# Patient Record
Sex: MALE | Race: WHITE | NOT HISPANIC OR LATINO | Employment: OTHER | URBAN - METROPOLITAN AREA
[De-identification: names, ages, dates, MRNs, and addresses within clinical notes are randomized per-mention and may not be internally consistent; named-entity substitution may affect disease eponyms.]

---

## 2018-01-02 ENCOUNTER — HOSPITAL ENCOUNTER (OUTPATIENT)
Dept: RADIOLOGY | Facility: OTHER | Age: 43
Discharge: HOME | End: 2018-01-02
Attending: PHYSICIAN ASSISTANT
Payer: COMMERCIAL

## 2018-01-02 ENCOUNTER — OFFICE VISIT (OUTPATIENT)
Dept: SPINE | Facility: CLINIC | Age: 43
End: 2018-01-02
Payer: COMMERCIAL

## 2018-01-02 ENCOUNTER — TELEPHONE (OUTPATIENT)
Dept: SPINE | Facility: CLINIC | Age: 43
End: 2018-01-02

## 2018-01-02 VITALS
WEIGHT: 183 LBS | DIASTOLIC BLOOD PRESSURE: 82 MMHG | HEIGHT: 70 IN | SYSTOLIC BLOOD PRESSURE: 130 MMHG | HEART RATE: 73 BPM | BODY MASS INDEX: 26.2 KG/M2

## 2018-01-02 DIAGNOSIS — M54.14 THORACIC AND LUMBOSACRAL NEURITIS: ICD-10-CM

## 2018-01-02 DIAGNOSIS — M47.819 SPONDYLOSIS WITHOUT MYELOPATHY: ICD-10-CM

## 2018-01-02 DIAGNOSIS — M54.5 LOW BACK PAIN, UNSPECIFIED BACK PAIN LATERALITY, UNSPECIFIED CHRONICITY, WITH SCIATICA PRESENCE UNSPECIFIED: Primary | ICD-10-CM

## 2018-01-02 DIAGNOSIS — M54.5 LOW BACK PAIN, UNSPECIFIED BACK PAIN LATERALITY, UNSPECIFIED CHRONICITY, WITH SCIATICA PRESENCE UNSPECIFIED: ICD-10-CM

## 2018-01-02 DIAGNOSIS — M54.42 ACUTE LEFT-SIDED LOW BACK PAIN WITH LEFT-SIDED SCIATICA: ICD-10-CM

## 2018-01-02 DIAGNOSIS — M51.37 DDD (DEGENERATIVE DISC DISEASE), LUMBOSACRAL: ICD-10-CM

## 2018-01-02 DIAGNOSIS — Z98.890 HISTORY OF LUMBAR LAMINECTOMY: Primary | ICD-10-CM

## 2018-01-02 DIAGNOSIS — M54.17 THORACIC AND LUMBOSACRAL NEURITIS: ICD-10-CM

## 2018-01-02 PROCEDURE — 99999 PR PBB SHADOW E&M-NEW PATIENT-LVL III: CPT | Mod: PBBFAC,,, | Performed by: PHYSICIAN ASSISTANT

## 2018-01-02 PROCEDURE — 72120 X-RAY BEND ONLY L-S SPINE: CPT | Mod: 26,FY,, | Performed by: RADIOLOGY

## 2018-01-02 PROCEDURE — 72120 X-RAY BEND ONLY L-S SPINE: CPT | Mod: TC,FY

## 2018-01-02 PROCEDURE — 72100 X-RAY EXAM L-S SPINE 2/3 VWS: CPT | Mod: 26,FY,, | Performed by: RADIOLOGY

## 2018-01-02 PROCEDURE — 99204 OFFICE O/P NEW MOD 45 MIN: CPT | Mod: S$GLB,,, | Performed by: PHYSICIAN ASSISTANT

## 2018-01-02 RX ORDER — METHOCARBAMOL 500 MG/1
500 TABLET, FILM COATED ORAL 3 TIMES DAILY PRN
Qty: 90 TABLET | Refills: 0 | Status: SHIPPED | OUTPATIENT
Start: 2018-01-02 | End: 2018-02-01

## 2018-01-02 RX ORDER — METHYLPREDNISOLONE 4 MG/1
TABLET ORAL
Qty: 1 PACKAGE | Refills: 0 | Status: SHIPPED | OUTPATIENT
Start: 2018-01-02

## 2018-01-02 NOTE — PROGRESS NOTES
Subjective:      Patient ID: Sallie Singh is a 42 y.o. male.    Chief Complaint: No chief complaint on file.      HPI     History of excision of subcutaneous lesion measuring approximately 2-3 cm in right upper back region by Dr. Doyle on 10/5/12. Had lumbar laminectomy 20 years ago and did well after surgery.     2-3 months ago he did some heavy lifting and had flare up of LBP that resolved. Did a lot of walking in last week and did fine. Woke up 2 days ago with constant left sided LBP with left leg pain posterior to his knee. No right leg pain. LBP = left leg pain. Pain kept him up last night. Some improvement in pain with walking and standing. Pain is worse with laying flat, bending. He rates his pain as a 7 on a scale of 1-10. Pain is described as sharp in nautre. No leg pain since his surgery above. No numbness, tingling, or weakness.     No PT or ESIs for his back. Surgery as above. He tried aspirin with some improvement. He is otherwise healthy.     Patient denies fevers, chills, night sweats, nausea, vomiting, and weight loss. Patient also denies bowel/bladder dysfunction, sexual dysfunction, and any saddle anesthesia.       Review of Systems   Constitution: Negative for fever, weakness, malaise/fatigue, night sweats, weight gain and weight loss.   HENT: Negative for hearing loss, nosebleeds and odynophagia.    Eyes: Negative for blurred vision and double vision.   Cardiovascular: Negative for chest pain, irregular heartbeat and palpitations.   Respiratory: Negative for cough, hemoptysis, shortness of breath and wheezing.    Endocrine: Negative for cold intolerance and polydipsia.   Hematologic/Lymphatic: Does not bruise/bleed easily.   Skin: Negative for dry skin, poor wound healing, rash and suspicious lesions.   Musculoskeletal: Positive for back pain.        See HPI for pertinent positives.   Gastrointestinal: Negative for bloating, abdominal pain, constipation, diarrhea, hematochezia, melena, nausea and  vomiting.   Genitourinary: Negative for bladder incontinence, dysuria, hematuria, hesitancy and incomplete emptying.   Neurological: Negative for disturbances in coordination, dizziness, focal weakness, headaches, loss of balance, numbness, paresthesias and seizures.   Psychiatric/Behavioral: Negative for depression and hallucinations. The patient is not nervous/anxious.            Objective:        General: Sallie is well-developed, well-nourished, appears stated age, in no acute distress, alert and oriented to time, place and person.     General    Vitals reviewed.  Constitutional: He is oriented to person, place, and time. He appears well-developed and well-nourished.   Pulmonary/Chest: Effort normal.   Abdominal: He exhibits no distension.   Neurological: He is alert and oriented to person, place, and time.   Psychiatric: He has a normal mood and affect. His behavior is normal. Judgment and thought content normal.           Gait: normal, tandem walking is normal and he is able to heel/toe stand.     On exam of the lumbar spine, well healed posterior lumbar incision. Minimal left sided lower lumbar tenderness.     Skin in lumbar region is warm to the touch without visible rashes.     muscle tone normal without spasm, limited range of motion with pain  Pain in flexion. and Pain in extension.    Strength testing of the bilateral LEs shows  Right hip abduction:  +5/5  Left hip abduction:  +5/5  Right hip flexion:  +5/5   Left hip flexion:  +5/5  Right hip extensors:  +5/5  Left hip extensors:  +5/5  Right quadriceps:  +5/5  Left quadriceps:  +5/5  Right hamstring:  +5/5  Left hamstring:  +5/5  Right dorsiflexion:  +5/5  Left dorsiflexion:  +5/5  Right plantar flexion:  +5/5  Left plantar flexion:  +5/5   Right EHL:  +5/5   Left EHL:  +5/5    negative clonus of bilateral LEs.     negative straight leg raise on bilateral LEs, however SLR on left LE recreates his back pain and he has tight hamstrings left > right LE.      DTRs:  Right patellar:  +2     Left patellar:  +2  Right achilles:  +2   Left achilles:  +2    Sensation is grossly intact in L2, L3, L4, L5, and S1 distribution.    Right hip has no pain with IR/ER. Left hip has no pain with IR/ER.      On exam of bilateral UEs, patient has full painfree ROM with no signs of clubbing, laxity, cyanosis, edema, instability, weakness, or tenderness.       XRAY INTERPRETATION:  X-rays of lumbar spine (AP/lat/flex/ext) dated 1/2/18 are personally reviewed and show diffuse lumbar spondylosis with mild DDD L1-L2 and L2-L3.        Assessment:       1. History of lumbar laminectomy    2. Spondylosis without myelopathy    3. DDD (degenerative disc disease), lumbosacral    4. Thoracic and lumbosacral neuritis    5. Acute left-sided low back pain with left-sided sciatica           Plan:       Orders Placed This Encounter    Ambulatory referral to Physical Therapy - Lumbar    methocarbamol (ROBAXIN) 500 MG Tab    methylPREDNISolone (MEDROL DOSEPACK) 4 mg tablet       History of lumbar laminectomy 20 years ago and he did well. 2-3 months ago he did some heavy lifting and had flare up of LBP that resolved. Did a lot of walking in last week and did fine. Woke up 2 days ago with constant left sided LBP with left leg pain posterior to his knee. No right leg pain. LBP = left leg pain. Known diffuse lumbar spondylosis with mild DDD L1-L2 and L2-L3. Pain likely due to flare up of underlying degeneration and he likely has myofascial component as well. Treatment options reviewed with patient along with above lumbar XRs. Following plan made:     - Medrol dose pack for symptom relief. Reviewed dosing and side effects.   - Prescription for robaxin to take prn for spasms. Reviewed side effects, may only tolerate at night.   - PT for lumbar spine with good HEP. External script given.   - If no improvement with above, consider lumbar MRI scan.     Follow-up: Return in about 2 months (around 3/2/2018).  If there are any questions prior to this, the patient was instructed to contact the office.